# Patient Record
Sex: MALE | Race: WHITE | NOT HISPANIC OR LATINO | Employment: UNEMPLOYED | URBAN - METROPOLITAN AREA
[De-identification: names, ages, dates, MRNs, and addresses within clinical notes are randomized per-mention and may not be internally consistent; named-entity substitution may affect disease eponyms.]

---

## 2021-10-07 ENCOUNTER — HOSPITAL ENCOUNTER (EMERGENCY)
Facility: HOSPITAL | Age: 26
Discharge: HOME | End: 2021-10-08
Attending: EMERGENCY MEDICINE

## 2021-10-07 DIAGNOSIS — I86.1 VARICOCELE: ICD-10-CM

## 2021-10-07 DIAGNOSIS — N39.0 URINARY TRACT INFECTION WITHOUT HEMATURIA, SITE UNSPECIFIED: ICD-10-CM

## 2021-10-07 DIAGNOSIS — N50.819 TESTICLE PAIN: Primary | ICD-10-CM

## 2021-10-07 PROCEDURE — 99284 EMERGENCY DEPT VISIT MOD MDM: CPT | Mod: 25

## 2021-10-07 PROCEDURE — 96372 THER/PROPH/DIAG INJ SC/IM: CPT

## 2021-10-08 VITALS
TEMPERATURE: 98 F | HEIGHT: 69 IN | RESPIRATION RATE: 18 BRPM | HEART RATE: 75 BPM | WEIGHT: 270 LBS | SYSTOLIC BLOOD PRESSURE: 125 MMHG | DIASTOLIC BLOOD PRESSURE: 82 MMHG | OXYGEN SATURATION: 99 % | BODY MASS INDEX: 39.99 KG/M2

## 2021-10-08 LAB
BILIRUB UR QL STRIP: NEGATIVE
CLARITY UR: CLEAR
COLOR UR: YELLOW
GLUCOSE UR QL STRIP: NEGATIVE
HGB UR QL STRIP: NEGATIVE
KETONES UR QL STRIP: ABNORMAL
LEUKOCYTE ESTERASE UR QL STRIP: ABNORMAL
MICROSCOPIC COMMENT: ABNORMAL
NITRITE UR QL STRIP: NEGATIVE
PH UR STRIP: 6 [PH] (ref 5–8)
PROT UR QL STRIP: ABNORMAL
RBC #/AREA URNS HPF: 3 /HPF (ref 0–4)
SP GR UR STRIP: >1.03 (ref 1–1.03)
URN SPEC COLLECT METH UR: ABNORMAL
UROBILINOGEN UR STRIP-ACNC: ABNORMAL EU/DL
WBC #/AREA URNS HPF: 36 /HPF (ref 0–5)

## 2021-10-08 PROCEDURE — 25000003 PHARM REV CODE 250: Performed by: PHYSICIAN ASSISTANT

## 2021-10-08 PROCEDURE — 87591 N.GONORRHOEAE DNA AMP PROB: CPT | Performed by: PHYSICIAN ASSISTANT

## 2021-10-08 PROCEDURE — 81000 URINALYSIS NONAUTO W/SCOPE: CPT | Performed by: PHYSICIAN ASSISTANT

## 2021-10-08 PROCEDURE — 87086 URINE CULTURE/COLONY COUNT: CPT | Performed by: PHYSICIAN ASSISTANT

## 2021-10-08 PROCEDURE — 87491 CHLMYD TRACH DNA AMP PROBE: CPT | Performed by: PHYSICIAN ASSISTANT

## 2021-10-08 PROCEDURE — 63600175 PHARM REV CODE 636 W HCPCS: Performed by: PHYSICIAN ASSISTANT

## 2021-10-08 RX ORDER — DOXYCYCLINE 100 MG/1
100 CAPSULE ORAL 2 TIMES DAILY
Qty: 14 CAPSULE | Refills: 0 | Status: SHIPPED | OUTPATIENT
Start: 2021-10-08 | End: 2021-10-15

## 2021-10-08 RX ORDER — CEFTRIAXONE 500 MG/1
500 INJECTION, POWDER, FOR SOLUTION INTRAMUSCULAR; INTRAVENOUS
Status: COMPLETED | OUTPATIENT
Start: 2021-10-08 | End: 2021-10-08

## 2021-10-08 RX ORDER — LIDOCAINE HYDROCHLORIDE 10 MG/ML
5 INJECTION INFILTRATION; PERINEURAL
Status: COMPLETED | OUTPATIENT
Start: 2021-10-08 | End: 2021-10-08

## 2021-10-08 RX ORDER — DOXYCYCLINE HYCLATE 100 MG
100 TABLET ORAL
Status: COMPLETED | OUTPATIENT
Start: 2021-10-08 | End: 2021-10-08

## 2021-10-08 RX ADMIN — DOXYCYCLINE HYCLATE 100 MG: 100 TABLET, COATED ORAL at 01:10

## 2021-10-08 RX ADMIN — LIDOCAINE HYDROCHLORIDE 5 ML: 10 INJECTION, SOLUTION INFILTRATION; PERINEURAL at 01:10

## 2021-10-08 RX ADMIN — CEFTRIAXONE SODIUM 500 MG: 500 INJECTION, POWDER, FOR SOLUTION INTRAMUSCULAR; INTRAVENOUS at 01:10

## 2021-10-10 LAB — BACTERIA UR CULT: NORMAL

## 2021-10-12 LAB
C TRACH DNA SPEC QL NAA+PROBE: NOT DETECTED
N GONORRHOEA DNA SPEC QL NAA+PROBE: NOT DETECTED

## 2022-09-18 ENCOUNTER — HOSPITAL ENCOUNTER (EMERGENCY)
Facility: HOSPITAL | Age: 27
Discharge: LEFT BEFORE DISCHARGE | End: 2022-09-19
Attending: STUDENT IN AN ORGANIZED HEALTH CARE EDUCATION/TRAINING PROGRAM
Payer: MEDICAID

## 2022-09-18 DIAGNOSIS — S02.609A: Primary | ICD-10-CM

## 2022-09-18 DIAGNOSIS — R55 SYNCOPE: ICD-10-CM

## 2022-09-18 PROCEDURE — 96374 THER/PROPH/DIAG INJ IV PUSH: CPT

## 2022-09-18 PROCEDURE — 99285 EMERGENCY DEPT VISIT HI MDM: CPT | Mod: 25

## 2022-09-19 VITALS
RESPIRATION RATE: 18 BRPM | SYSTOLIC BLOOD PRESSURE: 147 MMHG | DIASTOLIC BLOOD PRESSURE: 91 MMHG | BODY MASS INDEX: 33.93 KG/M2 | HEIGHT: 70 IN | TEMPERATURE: 99 F | OXYGEN SATURATION: 99 % | HEART RATE: 94 BPM | WEIGHT: 237 LBS

## 2022-09-19 LAB
ALBUMIN SERPL BCP-MCNC: 3.9 G/DL (ref 3.5–5.2)
ALP SERPL-CCNC: 48 U/L (ref 55–135)
ALT SERPL W/O P-5'-P-CCNC: 12 U/L (ref 10–44)
ANION GAP SERPL CALC-SCNC: 11 MMOL/L (ref 8–16)
AST SERPL-CCNC: 15 U/L (ref 10–40)
BASOPHILS # BLD AUTO: 0.03 K/UL (ref 0–0.2)
BASOPHILS NFR BLD: 0.3 % (ref 0–1.9)
BILIRUB SERPL-MCNC: 2 MG/DL (ref 0.1–1)
BUN SERPL-MCNC: 6 MG/DL (ref 6–20)
CALCIUM SERPL-MCNC: 9.1 MG/DL (ref 8.7–10.5)
CHLORIDE SERPL-SCNC: 107 MMOL/L (ref 95–110)
CO2 SERPL-SCNC: 23 MMOL/L (ref 23–29)
CREAT SERPL-MCNC: 1 MG/DL (ref 0.5–1.4)
DIFFERENTIAL METHOD: ABNORMAL
EOSINOPHIL # BLD AUTO: 0 K/UL (ref 0–0.5)
EOSINOPHIL NFR BLD: 0.2 % (ref 0–8)
ERYTHROCYTE [DISTWIDTH] IN BLOOD BY AUTOMATED COUNT: 14.6 % (ref 11.5–14.5)
EST. GFR  (NO RACE VARIABLE): >60 ML/MIN/1.73 M^2
GLUCOSE SERPL-MCNC: 97 MG/DL (ref 70–110)
HCT VFR BLD AUTO: 43.5 % (ref 40–54)
HGB BLD-MCNC: 15.2 G/DL (ref 14–18)
IMM GRANULOCYTES # BLD AUTO: 0.04 K/UL (ref 0–0.04)
IMM GRANULOCYTES NFR BLD AUTO: 0.3 % (ref 0–0.5)
LYMPHOCYTES # BLD AUTO: 2.7 K/UL (ref 1–4.8)
LYMPHOCYTES NFR BLD: 23.2 % (ref 18–48)
MCH RBC QN AUTO: 30.5 PG (ref 27–31)
MCHC RBC AUTO-ENTMCNC: 34.9 G/DL (ref 32–36)
MCV RBC AUTO: 87 FL (ref 82–98)
MONOCYTES # BLD AUTO: 1.1 K/UL (ref 0.3–1)
MONOCYTES NFR BLD: 9.6 % (ref 4–15)
NEUTROPHILS # BLD AUTO: 7.6 K/UL (ref 1.8–7.7)
NEUTROPHILS NFR BLD: 66.4 % (ref 38–73)
NRBC BLD-RTO: 0 /100 WBC
PLATELET # BLD AUTO: 200 K/UL (ref 150–450)
PMV BLD AUTO: 9.9 FL (ref 9.2–12.9)
POTASSIUM SERPL-SCNC: 3.3 MMOL/L (ref 3.5–5.1)
PROT SERPL-MCNC: 6.7 G/DL (ref 6–8.4)
RBC # BLD AUTO: 4.98 M/UL (ref 4.6–6.2)
SODIUM SERPL-SCNC: 141 MMOL/L (ref 136–145)
TROPONIN I SERPL DL<=0.01 NG/ML-MCNC: <0.006 NG/ML (ref 0–0.03)
WBC # BLD AUTO: 11.44 K/UL (ref 3.9–12.7)

## 2022-09-19 PROCEDURE — 63600175 PHARM REV CODE 636 W HCPCS: Performed by: STUDENT IN AN ORGANIZED HEALTH CARE EDUCATION/TRAINING PROGRAM

## 2022-09-19 PROCEDURE — 85025 COMPLETE CBC W/AUTO DIFF WBC: CPT | Performed by: STUDENT IN AN ORGANIZED HEALTH CARE EDUCATION/TRAINING PROGRAM

## 2022-09-19 PROCEDURE — 93005 ELECTROCARDIOGRAM TRACING: CPT

## 2022-09-19 PROCEDURE — 84484 ASSAY OF TROPONIN QUANT: CPT | Performed by: STUDENT IN AN ORGANIZED HEALTH CARE EDUCATION/TRAINING PROGRAM

## 2022-09-19 PROCEDURE — 80053 COMPREHEN METABOLIC PANEL: CPT | Performed by: STUDENT IN AN ORGANIZED HEALTH CARE EDUCATION/TRAINING PROGRAM

## 2022-09-19 PROCEDURE — 93010 EKG 12-LEAD: ICD-10-PCS | Mod: ,,, | Performed by: INTERNAL MEDICINE

## 2022-09-19 PROCEDURE — 93010 ELECTROCARDIOGRAM REPORT: CPT | Mod: ,,, | Performed by: INTERNAL MEDICINE

## 2022-09-19 RX ORDER — MORPHINE SULFATE 4 MG/ML
6 INJECTION, SOLUTION INTRAMUSCULAR; INTRAVENOUS
Status: COMPLETED | OUTPATIENT
Start: 2022-09-19 | End: 2022-09-19

## 2022-09-19 RX ORDER — MORPHINE SULFATE 4 MG/ML
8 INJECTION, SOLUTION INTRAMUSCULAR; INTRAVENOUS
Status: DISCONTINUED | OUTPATIENT
Start: 2022-09-19 | End: 2022-09-19 | Stop reason: HOSPADM

## 2022-09-19 RX ADMIN — MORPHINE SULFATE 6 MG: 4 INJECTION, SOLUTION INTRAMUSCULAR; INTRAVENOUS at 12:09

## 2022-09-19 NOTE — ED PROVIDER NOTES
"Encounter Date: 9/18/2022    SCRIBE #1 NOTE: I, Aldair Arredondo, am scribing for, and in the presence of,  Sue London MD. I have scribed the following portions of the note - Other sections scribed: HPI, ROS.     History     Chief Complaint   Patient presents with    Loss of Consciousness     Pt presents to ED c/o loc that occurred last night.  "I was going up the stairs and just blacked out."  Pt also reports numbness to left side of the face. Pt reports hitting his head and facial on wooden stairs.  Denies cp, sob, ha, dizziness, weakness, or any other symptoms.  Pt reports taking OTC medication for pain with no relief. Pain 10/10.      Megan Hicks is a 27 y. O male with a PMHx of HTN, that comes to the ED for evaluation of a syncopal episode beginning last night at around 10 PM. Patient reports he was going up the stairs after standing up fast when he had a syncopal episode, endorsing he "blacked out" and fell down approximately 14 wooden steps. Patient reports he hit his head, endorsing complaints of bilateral jaw pain, pain to his left forehead, dental pain worse in the left, posterior right neck pain, and states "he feels his teeth feel misaligned". Patient attests taking Excedrin extra strength earlier this morning with no immediate relief noted. No other medications taken PTA. No alleviating or exacerbating factors noted. Denies CP, SOB, abdominal pain, N/V, or other associated symptoms. Patient notes marijuana use, as well as reporting he had 2 alcoholic drinks prior to onset. No known allergies.    The history is provided by the patient. No  was used.   Review of patient's allergies indicates:  No Known Allergies  Past Medical History:   Diagnosis Date    Hypertension      Past Surgical History:   Procedure Laterality Date    ARM SURGERY        No family history on file.  Social History     Tobacco Use    Smoking status: Every Day     Types: Cigarettes    Smokeless tobacco: Never " "  Substance Use Topics    Alcohol use: Yes     Comment: OCC    Drug use: Yes     Types: Marijuana     Review of Systems   Constitutional:  Negative for chills and fever.   HENT:  Positive for dental problem (pain worse on left, "misaligned"). Negative for congestion and rhinorrhea.         (+) bilateral jaw pain   Eyes:  Negative for pain.   Respiratory:  Negative for cough and shortness of breath.    Cardiovascular:  Negative for chest pain and leg swelling.   Gastrointestinal:  Negative for abdominal pain, nausea and vomiting.   Endocrine: Negative for polyuria.   Genitourinary:  Negative for dysuria and hematuria.   Musculoskeletal:  Positive for neck pain (posterior right). Negative for gait problem.   Skin:  Negative for rash.   Allergic/Immunologic: Negative for immunocompromised state.   Neurological:  Positive for dizziness, syncope and headaches (left forehead). Negative for weakness.     Physical Exam     Initial Vitals [09/18/22 2339]   BP Pulse Resp Temp SpO2   (!) 147/94 107 18 98.9 °F (37.2 °C) 100 %      MAP       --         Physical Exam    Constitutional: He appears well-developed and well-nourished. He is not diaphoretic. No distress.   HENT:   Head: Normocephalic. Head is without raccoon's eyes and without Spencer's sign.       Mouth/Throat: Oropharynx is clear and moist and mucous membranes are normal. No oral lesions. There is trismus in the jaw. No dental abscesses or lacerations.       Eyes: Conjunctivae and EOM are normal. Pupils are equal, round, and reactive to light.   Neck: No JVD present.   Normal range of motion.  Cardiovascular:  Normal rate and regular rhythm.           Pulses:       Radial pulses are 2+ on the right side and 2+ on the left side.        Posterior tibial pulses are 2+ on the right side and 2+ on the left side.   Pulmonary/Chest: Breath sounds normal. No respiratory distress.   Abdominal: Abdomen is soft. Bowel sounds are normal. He exhibits no distension. There is no " abdominal tenderness. There is no rebound and no guarding.   Musculoskeletal:         General: No tenderness or edema. Normal range of motion.      Cervical back: Normal range of motion. No bony tenderness. Pain with movement present.      Thoracic back: Normal.      Lumbar back: Normal.     Lymphadenopathy:     He has no cervical adenopathy.   Neurological: He is alert and oriented to person, place, and time.   Skin: Skin is warm. Capillary refill takes less than 2 seconds.   Psychiatric: He has a normal mood and affect.       ED Course   Procedures  Labs Reviewed   CBC W/ AUTO DIFFERENTIAL - Abnormal; Notable for the following components:       Result Value    RDW 14.6 (*)     Mono # 1.1 (*)     All other components within normal limits   COMPREHENSIVE METABOLIC PANEL - Abnormal; Notable for the following components:    Potassium 3.3 (*)     Total Bilirubin 2.0 (*)     Alkaline Phosphatase 48 (*)     All other components within normal limits   TROPONIN I          Imaging Results               CT Head Without Contrast (Final result)  Result time 09/19/22 01:49:39      Final result by Jacoby Rucker MD (09/19/22 01:49:39)                   Impression:      1.  Acute fractures of the right mandibular ramus and the parasymphyseal/body region of the left mandible.  Additionally, there is a mildly comminuted, mildly displaced fracture of the right lateral pterygoid plate.  Appropriate subspecialty consultation is advised.    2.  Slight cortical irregularity of the bilateral nasal bones and minimally displaced fractures are not excluded.  Correlation with point tenderness advised.    3.  No CT evidence of acute intracranial abnormality.    This report was flagged in Epic as abnormal.      Electronically signed by: Jacoby Rucker MD  Date:    09/19/2022  Time:    01:49               Narrative:    EXAMINATION:  CT HEAD WITHOUT CONTRAST; CT MAXILLOFACIAL WITHOUT CONTRAST    CLINICAL HISTORY:  Fall from height with  headache;; Focal tenderness to palpation of left jaw after fall;    TECHNIQUE:  Low dose axial images were obtained through the head and maxillofacial region.  Coronal and sagittal reformations were also performed. Contrast was not administered.    COMPARISON:  None.    FINDINGS:  CT HEAD:    There is no acute intracranial hemorrhage, hydrocephalus, midline shift or mass effect. Gray-white matter differentiation appears maintained. The basal cisterns are patent. The mastoid air cells appear well aerated.  The visualized bones of the calvarium demonstrate no acute osseous abnormality.    CT MAXILLOFACIAL:    There is a minimally displaced fracture of the right mandibular ramus.  There is an additional mildly displaced obliquely oriented fracture involving the left parasymphyseal/body region of the mandible.  There is adjacent subcutaneous emphysema.  There is a mildly comminuted, mildly displaced fracture of the right lateral pterygoid plate (axial series 3, image 318).  There is slight cortical irregularity of bilateral nasal bones and minimally displaced fractures are not excluded.  Correlation with point tenderness advised.  The mandibular condyles appear appropriately located.  There is prominent premandibular tissue edema.    There is trace mucosal thickening of the right maxillary sinus and anterior ethmoid air cells.  The remaining paranasal sinuses appear well aerated.  Globes are intact.  There is no retrobulbar hematoma.                                        CT Maxillofacial Without Contrast (Final result)  Result time 09/19/22 01:49:39      Final result by Jacoby Rucker MD (09/19/22 01:49:39)                   Impression:      1.  Acute fractures of the right mandibular ramus and the parasymphyseal/body region of the left mandible.  Additionally, there is a mildly comminuted, mildly displaced fracture of the right lateral pterygoid plate.  Appropriate subspecialty consultation is advised.    2.   Slight cortical irregularity of the bilateral nasal bones and minimally displaced fractures are not excluded.  Correlation with point tenderness advised.    3.  No CT evidence of acute intracranial abnormality.    This report was flagged in Epic as abnormal.      Electronically signed by: Jacoby Rucker MD  Date:    09/19/2022  Time:    01:49               Narrative:    EXAMINATION:  CT HEAD WITHOUT CONTRAST; CT MAXILLOFACIAL WITHOUT CONTRAST    CLINICAL HISTORY:  Fall from height with headache;; Focal tenderness to palpation of left jaw after fall;    TECHNIQUE:  Low dose axial images were obtained through the head and maxillofacial region.  Coronal and sagittal reformations were also performed. Contrast was not administered.    COMPARISON:  None.    FINDINGS:  CT HEAD:    There is no acute intracranial hemorrhage, hydrocephalus, midline shift or mass effect. Gray-white matter differentiation appears maintained. The basal cisterns are patent. The mastoid air cells appear well aerated.  The visualized bones of the calvarium demonstrate no acute osseous abnormality.    CT MAXILLOFACIAL:    There is a minimally displaced fracture of the right mandibular ramus.  There is an additional mildly displaced obliquely oriented fracture involving the left parasymphyseal/body region of the mandible.  There is adjacent subcutaneous emphysema.  There is a mildly comminuted, mildly displaced fracture of the right lateral pterygoid plate (axial series 3, image 318).  There is slight cortical irregularity of bilateral nasal bones and minimally displaced fractures are not excluded.  Correlation with point tenderness advised.  The mandibular condyles appear appropriately located.  There is prominent premandibular tissue edema.    There is trace mucosal thickening of the right maxillary sinus and anterior ethmoid air cells.  The remaining paranasal sinuses appear well aerated.  Globes are intact.  There is no retrobulbar hematoma.                                        CT Cervical Spine Without Contrast (Final result)  Result time 09/19/22 01:51:18      Final result by Anuradha Luis MD (09/19/22 01:51:18)                   Impression:      No acute cervical fracture.  Straightening of normal cervical lordosis.    Right mandibular nondisplaced fracture.      Electronically signed by: Anuradha Luis  Date:    09/19/2022  Time:    01:51               Narrative:    EXAMINATION:  CT OF THE CERVICAL  SPINE WITHOUT    CLINICAL HISTORY:  Fall from height;    TECHNIQUE:  2.5 mm axial images were obtained through the cervical  spine. Coronal and sagittal reformatted images were provided.    COMPARISON:  None.    FINDINGS:  There is straightening of the normal cervical lordosis, which may be due to muscular spasm or the presence of a cervical neck collar.  There is no prevertebral soft tissue swelling.  There is no vertebral body fracture or subluxation. Incidental note is made of right mandibular nondisplaced fracture peer                                       Medications   morphine injection 6 mg (6 mg Intravenous Given 9/19/22 0015)     Medical Decision Making:   History:   Old Medical Records: I decided to obtain old medical records.  Initial Assessment:   27-year-old male with no significant past medical history presents after a fall from height greater than 24 hours ago.  Patient currently endorsing headache control pain.  Patient no acute distress and no focal neurological deficits on exam he does have point tenderness to the left jaw and mild trismus.  Patient maintaining his secretions.  And tolerating p.o..  Given head injury and headache will obtain CT head face and spine.    DDx:   Seizure: no witnessed seizure activity, incontinence or h/o seizures to suggest seizure today  Hypoxia and hypoglycemia less likely in this patient with normal sats and BG of 100  Cardiac issue: electrical (dysarrhythmias, brugada, WPW, long QT).  Less likely  given no evidence of drop attack, no palpitations, no EKG findings to predispose to arrhythmias. No CP, no STEMI on EKG; NSTEMI unlikely to cause brief cardiogenic shock in absence of other significant findings, so likely does not need full cardiac rule out with troponins and stress.  Mechanical: outflow obstruction like HOCM or aortic stenosis, tamponade-less likely as no murmur, non-exertional, no hypertrophy on EKG.   Vessels: PE, dissection, AAA.  Clinical picture inconsistent, no abd pain, no pulsatile mass, symmetric pulses, no risk factors of PE  Volume issue: dehydration from vomiting/diarrhea/decreased PO, sepsis, GI bleed, ruptured ectopic or bleeding AAA. No signs of recent illness to suggest infection, no e/o anemia by history or PE,   Neuro: No HA, no personal or family h/o cerebral aneurysm, nml neuro exam, so this is unlikely ICH or sentinel bleed  Also: vasovagal, drugs/meds, autonomic insufficiency    Boyce Syncope Rule    1. History of CHF? no   2. Hematocrit <30? no   3. Normal ECG? yes   4. History of dyspnea? no   5. SBP <90 at triage? no        Independently Interpreted Test(s):   I have ordered and independently interpreted EKG Reading(s) - see summary below  Clinical Tests:   Lab Tests: Ordered and Reviewed  Radiological Study: Ordered and Reviewed  Medical Tests: Ordered and Reviewed        Scribe Attestation:   Scribe #1: I performed the above scribed service and the documentation accurately describes the services I performed. I attest to the accuracy of the note.      ED Course as of 09/19/22 1058   Mon Sep 19, 2022   0054 CBC without leukocytosis, troponin negative, CMP without any acute electrolyte abnormality. [AS]   0222 CT imaging with bilateral mandibular fractures.  Given patient has trismus patient to be transferred to [facility] in the ED to ED transfer for further evaluation and management.  Patient's tetanus updated given morphine pain control. [AS]   0247 Patient initially  agreed to transfer but after finding out his significant other is not able to write along with EMS he wanted to leave against medical advice.  He reports he lives far away and need to arrange transportation prior to being treated. I discussed the risks of AMA. The patient has decision making capacity and was able to verbalize to me understood the risks of not being seen today: including but not limited to threat to life, limb and permanent disability.     I discussed further follow up options for the patient and reminded them that they should return to the ED with any new or worsening symptoms.They verbalized their understanding back to me. They were given the opportunity to ask questions, which were reasonably addressed to the best of my ability and their apparent satisfaction.   [AS]      ED Course User Index  [AS] Sue London MD                   Clinical Impression:   Final diagnoses:  [R55] Syncope  [S02.609A] Closed fracture of multiple sites of mandible, initial encounter (Primary)   ISue MD, personally performed the services described in this documentation. All medical record entries made by the scribe were at my direction and in my presence. I have reviewed the chart and agree that the record reflects my personal performance and is accurate and complete.    This dictation has been generated using M-Modal Fluency Direct dictation; some phonetic errors may occur.        ED Disposition Condition    AMA Stable                Sue London MD  09/19/22 [id number]

## 2022-09-19 NOTE — ED NOTES
Pt/ c/o head & mouth pain, states scratches on left forehead area result from fall, denies taking any meds prior to fall , denies n/v .  c/o of lower mouth numbness, physician in room.

## 2022-09-19 NOTE — ED NOTES
Patient refused transfer request to leave states will go to [facility] later . MD explained risk to patient leaving ama //patient verbalized understanding request to leave.